# Patient Record
Sex: FEMALE | Race: WHITE
[De-identification: names, ages, dates, MRNs, and addresses within clinical notes are randomized per-mention and may not be internally consistent; named-entity substitution may affect disease eponyms.]

---

## 2019-01-18 NOTE — NUR
PT ADMITTED RELATED TO FALL,INTRACTABLE N/V,DEHYDRATION. CM REVIEWED CHART AND
SPOKE WITH CARE TEAM. CM MET WITH PT AND GRANDTR JAZZMINE AT BEDSIDE THIS DAY. PT
IS A&O X4. CM ROLE INTORDUCED. PT INDICATED SHE LIVES IN A HOUSE WITH HER
GRANDTR WITH 2 STEPS TO ENTER AND NO STEPS INSIDE. PT INDICATED SHE HAD BEEN
INDEPENDENT WITH GAIT AND ADLS PTA. PT INDICATED NO DME OR HH HX. PT INDICATED
SHE PLANS TO RETURN HOME ONCE MEDICALLY STABLE. CM TO FOLLOW AS INDICATED WITH
DC PLANNING.

## 2019-01-18 NOTE — NUR
ASSUMED PT CARE AT 0700. PT A&O X4. PT STATED NO N/V. PT STATES PAIN WHILE
MOVING. PT TOLERATES MEDS. PT ABLE TO AMBULATE TO BATHROOM AND HALLWAY. PT ON
STANDBY ASSIST. PT DESATS ON AMBULATION AND CURRENTLY ON NC AT 2.0L. PT
CALL LIGHT WITHIN REACH AND CONTINUES TO BE MONITORED FOR SAFETY.

## 2019-01-18 NOTE — NUR
Assumed care of pt at 1900. Pt alert and oriented x4. C/o pain in lower back.
 on call notified and new orders noted. Heating pad ordered for back pain as
well. Pt tried to eat dinner but was unable to keep it down for long. States
she has had the pain and n/v since Monday 1/14. Will continue to monitor and
assist with needs.

## 2019-01-19 NOTE — NUR
PT D/C'D IN W/C WITH DAUGHTER AND STAFF, AND MED HELD IN XIS (SIGNED PAPER
SHOWING RECEIPT) SUCCESSFULLY EARLIER IN THE DAY

## 2019-01-19 NOTE — NUR
PT IS A&0X4, C/O MID BACK, ASKS FOR PAIN MEDICATION, ENCOURAGED HER WITH
TRYING TO PASS THIS MED AS FREQUENTLY AS PRESCRIBED, ENCOURAGED HER TO USE
CALL LIGHT FOR ANY NEEDS, FAMILY VISITING, PT SHOWERING, AMB STEADY.

## 2019-01-19 NOTE — NUR
Assumed care of pt at 1900. Pt a&o x4. Prn pain meds administered due to c/o
back pain. Pt requests a lidocaine patch to be ordered.  on call notified
and new order noted. SBA to the restroom. Pt home oitments administered on
nose and right foot. Will continue to monitor and assist with needs.

## 2019-01-19 NOTE — NUR
ASSUMED PT CARE AT APPROX 0300.PT UP WITH SBA.ON 2L/NC.PER RT,PT REQUESTED FOR
HER INHALER AND AM NURSE HAS BEEN UPDATED TO CALL PHYSICIAN FOR AN ALTERNATIVE
SINCE THIS FACILITY DOESN'T CARRY WHAT SHE IS ON.PT CONT ON IVF.NO CONCERNS
NOTED.

## 2019-01-31 NOTE — P
[facility]
[address]
[place]   [address]                     PROCEDURE REPORT              
_______________________________________________________________________________
 
Name:       EMMY MGALDJAN JOSE            Room #:         220-P       [facility] [place]  
M.R.#:      [id number]                       Account #:      [id number]  
Admission:  01/31/19    Attend Phys:    José Guerrero MD  
Discharge:              Date of Birth:  [birth year]  
                                                          Report #: [id number]
                                                                    [id number]   
_______________________________________________________________________________
THIS REPORT FOR:   //name//                          
 
CC: José Guerrero MD
 
DATE OF SERVICE:  02/01/2019
 
 
BRIEF HISTORY:  The patient is a 78-year-old woman who had a fall on the ice 3
weeks ago.  She had been taking ibuprofen and Tylenol No. 3 for pain.  She also
had been using ibuprofen.  She subsequently developed problems with nausea and
vomiting as well as chest pressure.  She also developed some pleural effusion
requiring thoracentesis.
 
PREOPERATIVE DIAGNOSIS:  Dysphagia and chest pressure.
 
POSTOPERATIVE DIAGNOSES:
1.  Moderate Candida esophagitis.
2.  Diffuse gastritis.
 
MEDICATIONS:  Deep sedation with propofol per anesthesia.
 
SPECIMENS:
1.  Biopsies, gastric mucosa, rule out H. pylori.
2.  Brushing esophagus, rule out candidiasis.
 
ESTIMATED BLOOD LOSS:  3 mL.
 
PROCEDURE:  EGD with biopsy and esophageal brushing.
 
FINDINGS:  Prior to propofol sedation, the procedure of upper endoscopy was
discussed with the patient as well as potential risks, benefits and
complications.  She indicates she understands and desires to proceed.
 
DESCRIPTION OF PROCEDURE:  With the patient in the left lateral decubitus
position, the Olympus video endoscope was inserted in the cervical esophagus
under direct vision without difficulty.  Examination of the esophagus throughout
its entire length revealed moderate whitish plaque-like material throughout the
mid and distal esophagus consistent with diffuse Candida esophagitis.  The
intervening mucosa was normal.  I did not see any ulcers, erosions, strictures
or masses.  The scope was advanced and she was noted to have a 2-3 cm sliding
type hiatus hernia.  The mucosa and hernia was unremarkable.  The scope was
advanced into the stomach, which was examined on end view as well as retroflexed
views.  There was erythema in the antrum, a few scattered erosions were seen in
the antrum.  No ulcers were seen.  Upon retroflexion, no abnormalities were
seen.  The pylorus, duodenal bulb and postbulbar sweep were all inspected and
 
 
 
[facility]
[address]
[place]   [address]                     PROCEDURE REPORT              
_______________________________________________________________________________
 
Name:       HANS MG            Room #:         220-P       [facility] [place]  
[facility].#:      [id number]                       Account #:      [id number]  
Admission:  01/31/19    Attend Phys:    José Guerrero MD  
Discharge:              Date of Birth:  [birth year]  
                                                          Report #: [id number]
                                                                    [id number]   
_______________________________________________________________________________
noted to be unremarkable.  At that point, scope was slowly withdrawn and careful
circumferential views were obtained.  Biopsies were obtained of the gastritis
and brushings obtained of the candida.
 
DISPOSITION:  The patient with difficulty swallowing and some discomfort with
swallowing as well as chest pressure.  She has an exudative process consistent
with candidiasis.  She had no symptoms prior to her fall.  The etiology of her
candidiasis is not entirely clear.  However, it is noted she does use
fluticasone, which may put her at risk.  We will start her on Diflucan and
follow up on brushings.  Since she does use fluticasone, it is not clear if this
is related to her recent fall.
 
 
 
 
 
 
 
 
 
 
 
 
 
 
 
 
 
 
 
 
 
 
 
 
 
 
 
 
 
 
 
 
 
                         
   By:                               
                   
D: 02/01/19 1201                           _____________________________________
T: 02/01/19 2227                           Corona Rowland MD             /nt

## 2019-01-31 NOTE — NUR
PT ARRIVED ON UNIT FROM ED AT 1200.  PT WAS A/OX4 WITH NO ISSUES OR CONCERNS
AT THIS TIME.  PT HAS NO COMPLAINTS OF PAIN.  WILL CONTINUE TO MONITOR PT.
AWAITING ON US

## 2019-01-31 NOTE — NUR
pt is alert and oriented x4 , presents with a pleasant affect,slightly anxious
due to shortness of breath especially after activities. reports upper
abdominal pain and has utilized tylenol,needs stand by assist with adls,
reports poor appetite,last bm three days ago.had scheduled egd ,continues of
ivf,oxygen at 2l/nc. pt had thoracentesis on today,site clean with no
bleading.

## 2019-02-01 NOTE — NUR
ASSUMED CARE OF PATIENT THIS MORNING. PATIENT IS A&OX4. SHE IS UP WITH STANDBY
ASSISTANCE. EGD SCHEDULED FOR THIS MORNING AND PATIENT CURRENTLY OFF UNIT FOR
PROCEDURE. RIGHT AC IV IS CURRENTLY SALINE LOCKED. SHE IS ON 2 LITER OF O2.
PATIENT CURRENTLY NPO AND ALL MORNING MEDICATIONS WERE HELD. PATIENT HAS
DIMINISHED LUNG SOUNDS. ACTIVE BOWEL SOUNDS. LAST BOWEL MOVEMENT WAS 1/28.
WILL CHECK WITH PHYSICIAN TO SEE IF SOMETHING CAN BE PRESCRIBED FOR THE
PATIENT.

## 2019-02-02 NOTE — NUR
ASSUMED CARE OF PATIENT AT 1900. VSS. ASSESSMENT COMPLETED AT 2155. PT
ASLEEP ENTIRE SHIFT. ABLE TO WAKE UP AND ANSWER QUESTIONS APPROPRIATELY. NO
C/O N/V OR PAIN. NO SOA NOTED THIS SHIFT. CONTINUES ON O2 @ 2.0L VIA NC WITH
SPO2 @ 94%. ABLE TO CALL OUT APPROPRIATELY. CALL LIGHT WITHIN REACH. BED
LOCKED AND IN LOWEST POSITION. WCTM.

## 2019-02-02 NOTE — NUR
ASSUMED PT CARE AT 0700. PT AWAKE, ALERT/ORIENTED X4. UP WALKING AROUND ROOM
WITH NO DIFFICULTIES. O2 92% ON ROOM AIR. DENIES SHORTNESS OF AIR. NO COUGH.
DENIES PAIN AS WELL. ASSESSMENT IS AS CHARTED WITH CRACKLES NOTED TO BASES OF
LUNGS. IV REMOVED AS DC ORDERS HAVE BEEN ENTERED. VITAL SIGNS STABLE. WILL
DISMISS AFTER BREAKFAST.

## 2019-02-04 NOTE — PATH
[facility]
[address]
[place]   [address]                     PATHOLOGY RPT PROCEDURE       
_______________________________________________________________________________
 
Name:       HANS BENITEZ            Room #:         220-P       [facility] [place]  
M.R.#:      [id number]     Account #:      [id number]  
Admission:  01/31/19    Date of Birth:  [birth year]  
Discharge:  02/02/19                                    Report #:    [id number]
                                                        Path Case #: [id number]
_______________________________________________________________________________
 
LCA Accession Number: [id number]
.                                                                01
Material submitted:                                        .
BX GASTRITIS R/O H PYLORI
.                                                                01
Clinical history:                                          .
Pre-OP DX: Nausea, vomiting
Post-OP DX: Gastritis, candida
.                                                                02
**********************************************************************
Diagnosis:
Gastric mucosa, gastritis, endoscopic biopsy:
- Mild active gastritis with features of reactive gastropathy.
- Negative for intestinal metaplasia or atrophy.
- Negative for Helicobacter pylori (properly controlled
immunohistochemical stain performed).
(IUV:; 02/04/2019)
MBR/02/04/2019
**********************************************************************
.                                                                02
Electronically signed:                                     .
Nimisha U Vadlamani, MD, Pathologist
NPI- [id number]
.                                                                01
Gross description:                                         .
Received in formalin labeled "Hans Benitez, BX gastritis, rule out H.
pylori," are 6 segments of tan soft tissue measuring 1.5 x 0.9 x 0.2 cm in
aggregate dimensions and ranging from 0.2 to 0.5 cm in maximum dimension.
The specimen is submitted entirely in cassette A1.
(TSD; 2/1/2019)
TOB/TOB
.                                                                02
Pathologist provided ICD-10:
K29.60, K31.9
.                                                                02
CPT                                                        .
121976, X88135
Specimen Comment: A courtesy copy of this report has been sent to
Specimen Comment: [phone], [phone].
Specimen Comment: Report sent to  / DR MCGEE
***Performed at:  01
   [address]  [id number]
   MD Naveen Alcala MD Phone:  [phone]
***Performed at:  02
   [address]  [id number]
 
 
[address]   [address]                     PATHOLOGY RPT PROCEDURE       
_______________________________________________________________________________
 
Name:       HANS BENITEZ            Room #:         220-P       [facility] [place]  
M.R.#:      [id number]     Account #:      [id number]  
Admission:  01/31/19    Date of Birth:  [birth year]  
Discharge:  02/02/19                                    Report #:    [id number]
                                                        Path Case #: [id number]
_______________________________________________________________________________
   MD Nimisha Cota MD Phone:  [phone]

## 2019-02-05 NOTE — PATH
[facility]
[address]
[place]   [address]                     PATHOLOGY RPT PROCEDURE       
_______________________________________________________________________________
 
Name:       HANS MG            Room #:         220-P       [facility] [place]  
M.R.#:      [id number]     Account #:      [id number]  
Admission:  01/31/19    Date of Birth:  [birth year]  
Discharge:  02/02/19                                    Report #:    [id number]
                                                        Path Case #: [id number]
_______________________________________________________________________________
Note
LCA Accession Number: [id number]
   TESTS               RESULT  FLAG  UNITS    REF RANGE  LAB
------------------------------------------------------------
   Clinician Provided Cytology Information
   No. of containers..01 Other (Miscellaneous)
Source:                                                   01
   PLEURAL FLUID
DIAGNOSIS:                                                02
   PLEURAL FLUID
   INCONCLUSIVE.
   REACTIVE MESOTHELIAL CELLS ARE PRESENT.
   THIS INTERPRETATION INCLUDES EVALUATION OF A CELL BLOCK.
   NO MALIGNANT EPITHELIAL CELLS PRESENT.
   Comment:
   Numerous small lymphocytes are present. These may represent chronic
   inflammatory cells. Small portion of the pleural fluid is sent to
   [facility] for flow cytometric analysis. An addendum will be
   issued with those results.
Signed out by:                                            02
   Nimisha Cota MD, Pathologist
   NPI- [id number]
Performed by:                                             01
   Silva Alicea, Cytotechnologist ([facility])
Gross description:                                        01
   25 ML, YELLOW, CLOUDY
   /LCS
 
------------------------------------------------------------
    FLAG LEGEND:
    L-Low Normal,H-High Normal,LL-Alert Low,HH-Alert High
    <-Panic Low,>-Panic High,A-Abnormal,AA-Critical Abnormal
------------------------------------------------------------
 
Performed at:
[address] [facility] [address]
   [place]  [address]
   Naveen Alcala MD, Phone: [phone]
[address]  [address]
   Nimisha Cota MD, Phone: [phone]
Specimen Comment: A courtesy copy of this report has been sent to
Specimen Comment: [phone].
Specimen Comment: Report sent to 
***Performed at:  01
   [address], [place], [place]  [id number]
 
 
[address]   [address]                     PATHOLOGY RPT PROCEDURE       
_______________________________________________________________________________
 
Name:       HANS MG            Room #:         220-P       [facility] [place]  
M.R.#:      [id number]     Account #:      [id number]  
Admission:  01/31/19    Date of Birth:  [birth year]  
Discharge:  02/02/19                                    Report #:    [id number]
                                                        Path Case #: [id number]
_______________________________________________________________________________
   MD Naveen Alcala MD Phone:  [phone]

## 2019-02-05 NOTE — PATH
[facility]
[address]
[place]   [address]                     PATHOLOGY RPT PROCEDURE       
_______________________________________________________________________________
 
Name:       HANS MG            Room #:         220-P       [facility] [place]  
.R.#:      [id number]     Account #:      [id number]  
Admission:  01/31/19    Date of Birth:  [birth year]  
Discharge:  02/02/19                                    Report #:    [id number]
                                                        Path Case #: [id number]
_______________________________________________________________________________
Note
LCA Accession Number: [id number]
   TESTS               RESULT  FLAG  UNITS    REF RANGE  LAB
------------------------------------------------------------
   Clinician Provided Cytology Information
   No. of containers..01 Other (Miscellaneous)
Source:                                                   01
   ESOPHAGEAL BRUSHING
DIAGNOSIS:                                                02
   ESOPHAGEAL BRUSHING
   NEGATIVE FOR MALIGNANT CELLS.
   REACTIVE SQUAMOUS CELLS ARE PRESENT.
   FUNGAL ORGANISMS MORPHOLOGICALLY CONSISTENT WITH "CANDIDA" SPECIES ARE
   PRESENT.
   FEW BACTERIAL AGGREGATES PRESENT, CANNOT EXCLUDE ORAL INA.
Signed out by:                                            02
   Nimisha Cota MD, Pathologist
   NPI- [id number]
Performed by:                                             01
   Franky Stone, Cytotechnologist ([facility])
Gross description:                                        01
   .5ML, COLORLESS, CLEAR
   /LCS
 
------------------------------------------------------------
    FLAG LEGEND:
    L-Low Normal,H-High Normal,LL-Alert Low,HH-Alert High
    <-Panic Low,>-Panic High,A-Abnormal,AA-Critical Abnormal
------------------------------------------------------------
 
Performed at:
[address]
   [place]  [address]
   Naveen Alcala MD, Phone: [phone]
[address] [place] [address]  [address]
   Nimisha Cota MD, Phone: [phone]
Specimen Comment: A courtesy copy of this report has been sent to
Specimen Comment: [phone].
Specimen Comment: A duplicate report has been generated due to demographic
updates.
***Performed at:  01
   [address], [place], [place]  [id number]
   MD Naveen Alcala MD Phone:  [phone]

## 2019-08-13 NOTE — NUR
Pain Clinic Assessment:
 
1. History of Osteoarthritis:
BACK
   History of Rheumatoid Arthritis:
Not Applicable
 
2. Height: 5 ft. 3 in. 160.0 cm.
   Weight: 129.8 lb.  oz. 58.877 kg.
   Patient's BMI: 23.0
 
3. Vital Signs:
   BP: 140/58 Pulse: 91 Resp: 16
   Temp:  02 Sat: 100 ECG Mon:
 
4. Pain Intensity: 4
 
5. Fall Risk:
   Dizziness: Y  Needs help standing or walking: N
   Fallen in the last 3 months: N
   Fall risk comments:
 
 
6. Patient on Blood Thinner: None
 
7. History of Hypertension: N
 
8. Opioid Therapy greater than 6 weeks: N
   Opiate Contract Signed:
 
9. Risk Assessment Tool Provided: LOW RISK 0/3
 
10. Functional Assessment Tool: 41/70
 
11. Recreational Drug Use: Never Drug Type:
    Tobacco Use: Current Every Day Smoker Tobacco Type: Cigarettes
       Amount or Packs/day: 1/2 DAY How Many Years: 60
    Alcohol Use: No  Frequency:  Quant:

## 2019-08-14 NOTE — HPC
[facility]
[address]
[place]   [address]                     PAIN MANAGEMENT CONSULTATION  
_______________________________________________________________________________
 
Name:       HANS MG            Room #:                     [facility]      [id number]                       Account #:      [id number]  
Admission:  19    Attend Phys:    Krish Benitez DO  
Discharge:              Date of Birth:  [birth year]  
                                                          Report #: [id number]
                                                                    [id number]   
_______________________________________________________________________________
THIS REPORT FOR:   //name//                          
 
CC: Krish Guerrero MD
 
DATE OF SERVICE:  2019
 
 
REFERRING PHYSICIAN:  José Guerrero MD
 
CHIEF COMPLAINT:  Mid back pain.
 
HISTORY OF PRESENT ILLNESS:  As you know, the patient is a pleasant 78-year-old
female who reports acute onset of mid back pain that began after a slip and fall
injury sustained on the ice in January of this year.  The patient stated she was
in her normal state of health until this slip and fall injury occurred.  She was
brought to the Emergency Department where x-ray imaging was obtained.  There did
not appear to be any acute changes.  She was subsequently discharged home.  She
returned less than 1 week later with continued pain.  She underwent imaging
again, again no specific findings though they did find a chronic T11 compression
fracture.  She was subsequently discharged home after hospitalization for GI
issues that self-resolved.  The patient has trialled conservative treatment
options, but yet has not improved.  She underwent MRI imaging of the lumbar
spine, though the patient reports her symptoms are in the mid thoracic area. 
There is no radiation of pain into the lumbar spine, nor are there any low back
symptoms or lower extremity symptoms.  She was subsequently then referred to our
clinic to discuss options for treatment for mid back pain.
 
The patient indicates today pain is steady, describes the pain as stabbing.  She
places current pain score 4/10, daily average at 5/10, worst pain has been
10/10.  The patient states that walking, standing exacerbates symptoms, sitting
and lying down, tends to improve pain.  She reports today with MRI imaging of
the lumbar spine, which shows minimal changes, but a noted L1 compression
fracture that is well healed as well as the known T11 compression fracture that
was well healed.  There is also some moderate disk bulging present at the L4-L5
level, but this would not correlate with the patient's mid back symptoms.  She
has been referred to our service to discuss options for treatment.
 
PAST MEDICAL HISTORY:
1.  Hypertension.
2.  History of pleural effusions, congestive heart failure.
3.  COPD.
4.  Emphysema.
 
PAST SURGICAL HISTORY:   section.
 
 
 
[address]   [address]                     PAIN MANAGEMENT CONSULTATION  
_______________________________________________________________________________
 
Name:       HARIHTAHANS B            Room #:                     REG CLI 
[facility]      [id number]                       Account #:      [id number]  
Admission:  19    Attend Phys:    Krish Benitez DO  
Discharge:              Date of Birth:  [birth year]  
                                                          Report #: [id number]
                                                                    [id number]   
_______________________________________________________________________________
 
SOCIAL HISTORY:  The patient reports 1/2 a pack to 1 pack tobacco per day.  She
smoked for greater than 60 years.  She is denying use of alcohol, IV or illicit
drug use.  She is retired, not receiving workmen's compensation nor is she
trying to obtain disability benefits.  She is accompanied by her daughter
present in room today.
 
REVIEW OF SYSTEMS:  Positive for weight change, decrease in appetite, fatigue
and weakness, eye disease, wearing corrective eyewear, cataracts, hearing loss
with tinnitus, shortness of breath walking or lying flat, palpitations, frequent
and recurrent coughs, asthma, wheezing, emphysema, COPD, change in bowel
movements, frequent diarrhea, nocturia, change of force or stream urination,
lightheadedness and dizziness, numbness and tingling sensations, tremors,
insomnia, heat and cold intolerance, bleeding and bruising tendencies.  All
other review of systems negative per 12-point review of systems other than those
listed in history of present illness.  Pain impact score 27/70 indicating
mild-to-moderate interference of daily activities secondary to pain.
 
ALLERGIES:  No known drug allergies.
 
CURRENT MEDICATIONS:  Otezla 30 mg twice a day, valsartan 160 mg once a day,
vitamin D3 2000 units once a day, Allergy Relief 10 mg once a day and Trelegy
Ellipta 1 puff daily.
 
IMAGING:  MRI of lumbar spine shows compression fracture at T11, well healed. 
No evidence of marrow edema, no retropulsion.  T12-L1, mild disk space loss, no
central canal or neuroforaminal stenosis.  L1-L2, superior endplate compression
fracture, well healed, unchanged from January imaging.  No marrow edema, no
retropulsion.  L2-L3, loss of disk height, mild disk bulge, minimal central
canal stenosis, no neuroforaminal stenosis.  L4-L5 disk space loss.  No
significant volume loss from a disk standpoint.  AP diameter of the canal is
normal.  L5-S1, minimal disk bulge, no evidence of protrusion, no central canal
or neuroforaminal stenosis.
 
PQRS:  The patient has osteoarthritic changes of the lumbar spine and cervical
spine.  No rheumatoid arthritis.  She is placing pain intensity 4/10.  She is
not a fall risk, has not had a fall in last 3 months.  She is not on blood
thinners.  She is not treated for hypertension.  She is not on chronic opioids. 
She has a low opioid addiction potential based on our assessment tool.  She is
placing a pain impact score 24/70, mild-to-moderate interference of daily
activities secondary to pain.
 
PHYSICAL EXAMINATION:
VITAL SIGNS:  Blood pressure 140/58, pulse 91, respiratory rate 16 and
unlabored.  The patient is 100% on room air.  Height 5 feet 3 inches tall,
weight 129.8 pounds, BMI calculated 23.0.
 
 
 
[facility]
[address]
[place]   [address]                     PAIN MANAGEMENT CONSULTATION  
_______________________________________________________________________________
 
Name:       HANS MG            Room #:                     REG CLI 
M.R.#:      [id number]                       Account #:      [id number]  
Admission:  19    Attend Phys:    Krish Benitez DO  
Discharge:              Date of Birth:  [birth year]  
                                                          Report #: [id number]
                                                                    [id number]   
_______________________________________________________________________________
GENERAL:  Well-developed, well-nourished, well-hydrated 78-year-old female,
appearing stated age.  She is in mild distress secondary to pain, placing
current pain score 4/10.
HEENT:  Normocephalic, atraumatic.  Pupils equal, round, reactive to light. 
Extraocular muscles are intact.
NEUROLOGIC:  Speech is fluent.  The patient deemed a good historian.
LUNGS:  Clear, no wheezes, rhonchi or rales, prolonged expiratory phase noted. 
There is no pain with deep inhalation and exhalation.
CARDIOVASCULAR:  Regular.  No appreciable gallop, no rub.
ABDOMEN:  Soft, nontender, nondistended, normoactive bowel sounds.
EXTREMITIES:  Show no clubbing, no cyanosis, and no edema.
MUSCULOSKELETAL:  There is palpatory tenderness over the mid thoracic area
around T7 through T9.  There are no skin changes, lesions or ecchymosis over the
area.  No spinous process tenderness.  Deep palpation of the area causes
intensification of pain, which does radiate in a radicular like pattern towards
the anterior chest.  Valsalva test does cause similar findings.  Upper extremity
strength is symmetrical, but deconditioned.  She is intact to light touch from
C5 through T1 dermatomes.  Lower extremity strength is equal and symmetrical,
5/5, though deconditioned.  Muscle bulk and tone is symmetrical on looking at
the lower extremities in comparison.  Seated straight leg raising negative. 
Supine straight leg raising negative.  EDDI'S test is negative.  Modified
Gaenslen's positive for some axial low back pain.
 
ASSESSMENT:
1.  Thoracic pain.
2.  Possible thoracic radiculopathy.
3.  Myofascial symptoms.
 
PLAN:
1.  Based on today's physical exam and history the patient has provided, the
description the patient uses in regards to pain as well as the location of
symptoms and the distribution of symptoms radiating from the mid thoracic area
around the anterior chest wall bilaterally and towards the xiphoid process, the
source of the patient's pain appears to be thoracic in origin.  There is some
concern of thoracic radiculopathy.  The patient and I had a discussion today
about the findings of her MRI.  I am pleased to advise the patient that her
lumbar spine appears fairly typical for her age and her consistent smoking
activity.  There is no specific lateralizing feature in the area that would be
consistent with the pain generator in the mid thoracic area.  There was question
about whether or not the patient's bilateral lower extremity weakness is due to
findings in the lumbar spine.  This is simply not the case, as the patient has
no significant central canal stenosis that would be leading to bilateral lower
extremity weakness.  Other sources could be vascular in origin or possibly some
type of other neurologic issue, but it is not related to central canal stenosis.
 In regards to the patient's thoracic pain, further evaluation would be
necessary.
 
 
 
[facility]
[address] [place]   [address]                     PAIN MANAGEMENT CONSULTATION  
_______________________________________________________________________________
 
Name:       HANS MG            Room #:                     REG CLI 
M.R.#:      [id number]                       Account #:      [id number]  
Admission:  19    Attend Phys:    Krish Benitez,   
Discharge:              Date of Birth:  [birth year]  
                                                          Report #: [id number]
                                                                    [id number]   
_______________________________________________________________________________
2.  Recommend the patient to undergo x-ray imaging of the thoracic spine. X-ray
imaging will provide us information about the bony structures in the area.  I am
concerned about thoracic radiculopathy, and thus, the likely next imaging study
will be an MRI.  We will review the findings of the x-ray imaging once
available.  If these are not sufficient to be able to determine the source of
symptoms, we would recommend the patient undergo a thoracic MRI without
contrast.  Thoracic imaging via an MRI will give us information about the
thoracic disks as well as the nerve root positioning and whether or not
compressive forces are acting to cause not only the thoracic radicular symptoms
the patient has been experiencing with radiation to the anterior chest on what
appears to be a T7 distribution, but will also potentially determine if there is
some central canal stenosis that may be the source of the patient's lower
extremity weakness.  We will have the patient undergo the x-ray imaging.  If
this is not sufficient in providing diagnostic information, we will move forward
with an MRI of the thoracic spine.
3.  We have agreed to provide the patient with a short dosing of medication for
pain control.  We have given the patient hydrocodone 5/325 one tab p.o. q.6
hours p.r.n. for pain.  I have given the patient 60 tablets total.  She was
advised to take the medication only as directed.  She is not to take more than 3
tablets per day to 4 tablets a day.  She is to watch for side effects of
sleepiness, disorientation, confusion, mental slowing and constipation.  If she
notes side effects, discontinue immediately.
4.  The patient will contact our clinic tomorrow about the findings of the x-ray
imaging.  If required, we will then have the patient undergo the MRI of the
thoracic spine for further evaluation.
 
We wish to thank Dr. Guerrero for the referral of the patient to our clinic.  We
will keep you apprised of response to treatment as we continue the evaluation
and workup for her thoracic-generated pain.  Again, we wish to thank you for the
opportunity to see the patient in consultation.
 
 
 
 
 
 
 
 
 
 
 
 
 
 
  <ELECTRONICALLY SIGNED>
   By: Krish Benitez DO          
  19     0814
D: 19 1646                           _____________________________________
T: 19 0330                           Krish Benitez DO            /nt

## 2019-08-20 NOTE — NUR
Pain Clinic Assessment:
 
1. History of Osteoarthritis:
BACK
   History of Rheumatoid Arthritis:
Not Applicable
 
2. Height: 5 ft. 3 in. 160.0 cm.
   Weight: 130.0 lb.  oz. 58.968 kg.
   Patient's BMI: 23.0
 
3. Vital Signs:
   BP: 146/62 Pulse: 96 Resp: 14
   Temp:  02 Sat: 100 ECG Mon:
 
4. Pain Intensity: 5
 
5. Fall Risk:
   Dizziness: Y  Needs help standing or walking: N
   Fallen in the last 3 months: N
   Fall risk comments:
 
 
6. Patient on Blood Thinner: None
 
7. History of Hypertension: N
 
8. Opioid Therapy greater than 6 weeks: N
   Opiate Contract Signed:
 
9. Risk Assessment Tool Provided: LOW RISK 0/3
 
10. Functional Assessment Tool: 41/70
 
11. Recreational Drug Use: Never Drug Type:
    Tobacco Use: Current Every Day Smoker Tobacco Type: Cigarettes
       Amount or Packs/day: 1/2 How Many Years: 70
    Alcohol Use: No  Frequency:  Quant:

## 2019-08-21 NOTE — HPC
[facility]
[address]
[place]   [address]                     PAIN MANAGEMENT CONSULTATION  
_______________________________________________________________________________
 
Name:       HANS MG            [facility]                     [facility]      [id number]                       Account #:      [id number]  
Admission:  08/20/19    Attend Phys:    Krish Benitez DO  
Discharge:              Date of Birth:  [birth year]  
                                                          Report #: [id number]
                                                                    [id number]   
_______________________________________________________________________________
THIS REPORT FOR:   //name//                          
 
CC: Krish Guerrero MD
 
DATE OF SERVICE:  08/20/2019
 
 
CHIEF COMPLAINT:  Mid back pain.
 
HISTORY OF PRESENT ILLNESS:  As you know, the patient is a very pleasant
78-year-old female who was referred to our service for acute onset of mid back
pain that began after a slip and fall injury sustained on the ice in January
2019.  The patient suffered a vertebral compression fracture, which was noted on
imaging study.  She continues to experience mid back pain with radiation in a
radicular fashion towards the anterior chest.  We saw the patient in
consultation on 08/13/2019, sent the patient for further imaging of the thoracic
areas.  There was a concern of possible increase in retropulsion and compression
at the site.  She returns today to review the MRI and to discuss treatment
options further.  She is placing pain score at 5/10.  She denies new injury or
trauma since our last visit.
 
ALLERGIES:  No known drug allergies.
 
CURRENT MEDICATIONS:  Otezla 30 mg twice a day, valsartan 160 mg once a day,
vitamin D3 2000 units once a day, Allergy Relief 500 mg once a day, Trelegy
Ellipta 1 puff once a day.
 
SOCIAL HISTORY:  The patient continues to smoke half pack to 1 pack per day. 
She smoked for greater than 60 years.  Denies IV or illicit drug use.  Denies
any chronic alcohol use.  She is retired.  She is accompanied by daughter
present in room today.
 
IMAGING:  MRI of thoracic spine obtained 08/16/2019 shows marked compression
fracture within the thoracic spine, which was present on the February 2019 chest
films.  There is superior wedging of the lower thoracic vertebral bodies also
seen in the 02/02/2019 study.  Marked compression of the mid thoracic spine
shows a low T1 signal in the marrow represent sclerosis and loss of the normal
fatty marrow.  Lower thoracic fractures appear stable.  There is noted
retropulsion of the posterior endplate of the mid thoracic fracture.
 
PQRS:  The patient has arthritic changes of lumbar spine and cervical spine.  No
rheumatoid arthritis.  Pain intensity today 5/10.  She is not a fall risk, has
not had a fall in the last 3 months.  She is not on blood thinners, not treated
for hypertension.  She is not on opioid.  She has a low opioid addiction
 
 
 
[address]   [address]                     PAIN MANAGEMENT CONSULTATION  
_______________________________________________________________________________
 
Name:       HANS MG            Room #:                     [facility]#:      [id number]                       Account #:      [id number]  
Admission:  08/20/19    Attend Phys:    Krish Benitez DO  
Discharge:              Date of Birth:  [birth year]  
                                                          Report #: [id number]
                                                                    [id number]   
_______________________________________________________________________________
potential based on our assessment tool.  She is placing pain impact at 41/70,
moderate to severe interference of daily activities secondary to pain.
 
PHYSICAL EXAMINATION:
VITAL SIGNS:  Blood pressure 146/62, pulse 96, respiratory rate 14 and
unlabored.  The patient is 100% on room air.  Height 5 feet 3 inches tall,
weight 130 pounds, BMI calculated 23.0.
GENERAL:  Well-developed, well-nourished, well-hydrated acutely kyphotic
78-year-old female appearing stated age, pain is rated today at around 5/10.
HEENT:  Normocephalic, atraumatic.  Pupils equal, round, reactive to light.
EXTREMITIES:  Show no clubbing, no cyanosis, and no edema.
MUSCULOSKELETAL:  The patient has tenderness to palpation in the mid upper
thoracic area.  No spinous process tenderness.  Deep palpation of the area
causes intensification of pain with what appears to be likely radicular like
pattern towards the anterior chest.  Valsalva maneuver does not cause any
significant changes today.  Upper extremity strength is symmetrical, but
deconditioned.
 
ASSESSMENT:
1.  Thoracic pain.
2.  Compression fracture of the thoracic spine.
3.  Thoracic radiculopathy.
4.  Myofascial pain.
 
PLAN:
1.  The patient has returned today in followup visit where we have reviewed her
MRI in its entirety.  It does not appear she has significant neuropathic
impingement upon the nerve roots exiting the thoracic spine.  There is noted
retropulsion of the posterior elements of the vertebral body at the compression
fracture site.  This is causing some abutment of the cord that may be the source
of the patient's symptoms currently.  It does not appear to be an acute
fracture.  The findings would indicate chronic with sclerosis changes in the
anterior wedging portion of this fracture.  After we reviewed the patient's MRI,
we were able to provide some suggestions for treatment.  The following was
discussed with the patient today.
2.  We discussed at length physical therapy, stretching exercise, core
strengthening and a concerted effort at trying to return the patient to more
anatomically correct position.  Bracing was discussed, but this will not help
the current fracture, which has healed in position and has significantly
degraded anterior wedge position, which would leave the patient in a kyphotic
stance permanently.  Bracing could provide some adjustment in the upper thoracic
area, but would only be noted to have improvement while in the bracing system as
soon as she comes off the bracing system, the fracture will require more
kyphotic stature.  Unfortunately, this fracture is old enough that no
augmentation can be performed.  I do not at this point recommend any type of
bracing system given its propensity that lead to weakness of the paraspinal
 
 
 
[facility]
[address]
[place]   [address]                     PAIN MANAGEMENT CONSULTATION  
_______________________________________________________________________________
 
Name:       VÍCTOR MGJAN JOSE            Room #:                     REG [facility].#:      [id number]                       Account #:      [id number]  
Admission:  08/20/19    Attend Phys:    Krish Benitez DO  
Discharge:              Date of Birth:  [birth year]  
                                                          Report #: [id number]
                                                                    [id number]   
_______________________________________________________________________________
musculature of the thoracic and lumbar area leading to more complications in
time.
3.  We would recommend physical therapy, stretching exercises, core
strengthening and we will provide the patient with a prescription to begin at
least twice a week for 6 weeks and possibly longer.  This will help the patient
to obtain strength in the paraspinal musculature of the upper thoracic, lower
thoracic and lumbar area.  This should assist in providing the patient a more
erect posture and reducing the kyphosis secondary to the compression fracture in
the mid thoracic area.  With strengthening of the paraspinal musculature, there
should be some improvement in pain.  We will start this process immediately. 
She was given a prescription today.
4.  The majority of the patient's pain appears to be myofascial in origin.  This
does appear to be related to the compression fracture of the mid thoracic area. 
She has pain radiating upward on the paraspinal musculatures as well as caudad
from the paraspinal areas down towards the lumbar region.  These appear to be
spasming of the paraspinal musculature.  We will provide the patient with a
prescription for methocarbamol 500 mg dose 1 tab p.o. t.i.d. for muscle
spasming.  I have given the patient #90 tablets with 2 refills.  The patient was
advised to watch for side effects of sleepiness, disorientation, confusion,
mental slowing while on the medication.  She is not to drive or operate heavy
equipment while utilizing this medication.
5.  We will start the patient on an anti-inflammatory in the form of meloxicam
7.5 mg 1 tab p.o. b.i.d.  This will provide baseline pain control and
anti-analgesic effects.  We will give the patient #60 tablets, 2 refills.  She
was advised of the risks and benefits of this medication.  We did advise the
patient will watch for dyspepsia, worsening of blood pressure, lower extremity
edema and we have also warned the patient about the cardiac risk that come with
anti-inflammatory medications including aspirin.  She was given this
prescription with 2 refills.
6.  We will see the patient back in followup visit on an as needed basis. 
Interventional treatment such as injections will not be beneficial given the
fracture has completely healed and there does not appear to be any neuropathic
component to the patient's symptoms currently.  Interventional treatment such as
epidural injections will provide no significant improvement in the symptoms, she
is currently experiencing though she does begin to experience more neuropathic
signal and pain, epidural might be beneficial.
7.  We will be returning the patient's care to her PCP for further evaluation.
 
We wish to thank Dr. Guerrero for the opportunity to see the patient in
consultation.  Re-adjustments of some medication should be quite beneficial.  We
will see her back on an as needed basis.
 
 
 
  <ELECTRONICALLY SIGNED>
   By: Krish Benitez DO          
  08/21/19     0746
D: 08/20/19 1523                           _____________________________________
T: 08/20/19 2317                           Krish Benitez DO            /nt

## 2019-10-21 ENCOUNTER — HOSPITAL ENCOUNTER (OUTPATIENT)
Dept: HOSPITAL 35 - CAT | Age: 79
End: 2019-10-21
Attending: INTERNAL MEDICINE
Payer: MEDICARE

## 2019-10-21 DIAGNOSIS — J43.9: Primary | ICD-10-CM

## 2019-10-21 DIAGNOSIS — J98.4: ICD-10-CM

## 2019-10-21 DIAGNOSIS — R91.1: ICD-10-CM

## 2019-12-20 ENCOUNTER — HOSPITAL ENCOUNTER (OUTPATIENT)
Dept: HOSPITAL 35 - RAD | Age: 79
End: 2019-12-20
Attending: PHYSICIAN ASSISTANT
Payer: MEDICARE

## 2019-12-20 DIAGNOSIS — J98.4: Primary | ICD-10-CM

## 2019-12-20 DIAGNOSIS — Z79.899: ICD-10-CM

## 2019-12-20 DIAGNOSIS — M43.8X6: ICD-10-CM

## 2019-12-20 DIAGNOSIS — M43.8X4: ICD-10-CM

## 2019-12-20 DIAGNOSIS — L40.0: ICD-10-CM

## 2020-01-20 ENCOUNTER — HOSPITAL ENCOUNTER (OUTPATIENT)
Dept: HOSPITAL 35 - NUC | Age: 80
End: 2020-01-20
Attending: NURSE PRACTITIONER
Payer: MEDICARE

## 2020-01-20 DIAGNOSIS — Z87.81: ICD-10-CM

## 2020-01-20 DIAGNOSIS — Z78.0: ICD-10-CM

## 2020-01-20 DIAGNOSIS — N91.2: Primary | ICD-10-CM

## 2020-10-20 ENCOUNTER — HOSPITAL ENCOUNTER (OUTPATIENT)
Dept: HOSPITAL 35 - CAT | Age: 80
End: 2020-10-20
Attending: NURSE PRACTITIONER
Payer: MEDICARE

## 2020-10-20 DIAGNOSIS — J43.9: ICD-10-CM

## 2020-10-20 DIAGNOSIS — S22.050A: Primary | ICD-10-CM
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
## 2020-10-20 DIAGNOSIS — R91.1: ICD-10-CM
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
## 2020-10-20 DIAGNOSIS — J98.4: ICD-10-CM
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
## 2025-02-19 NOTE — EKG
[facility] [place] [address]
[place]  [address]
Phone:  [phone]                    ELECTROCARDIOGRAM REPORT      
_______________________________________________________________________________
 
Name:       HANS MG            Room #:                     REG [facility]  
M.RVenkatesh#:      [id number]     Account #:      [id number]  
Admission:  19    Attend Phys:                          
Discharge:              Date of Birth:  [birth year]  
                                                          Report #: [id number]
   [id number]
_______________________________________________________________________________
THIS REPORT FOR:   //name//                          
 
                          ED
                                       
Test Date:    2019               Test Time:    08:14:39
Pat Name:     HANS MG         Department:   
Patient ID:   [id number]            Room:          
Gender:       F                        Technician:   
:          [id number]               Requested By: Raghav Denise
Order Number: [id number] MD:   Jonathan Rothman
                                 Measurements
Intervals                              Axis          
Rate:         85                       P:            85
KS:           151                      QRS:          91
QRSD:         78                       T:            55
QT:           375                                    
QTc:          446                                    
                           Interpretive Statements
Sinus rhythm with atrial premature complexes
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
No previous ECG available for comparison
 
Electronically Signed On 2019 9:23:50 CST by Jonathan Rothman
[email or web address]
 
 
 
 
 
 
 
 
 
 
 
 
 
 
 
 
 
 
 
  <ELECTRONICALLY SIGNED>
   By: Jonathan Rothman MD, [facility]   
  19     0923
D: 19 0814                           _____________________________________
T: 19                           Jonathan Rothman MD, FACC     /EPI
Anterior